# Patient Record
Sex: FEMALE | Race: WHITE | URBAN - METROPOLITAN AREA
[De-identification: names, ages, dates, MRNs, and addresses within clinical notes are randomized per-mention and may not be internally consistent; named-entity substitution may affect disease eponyms.]

---

## 2017-09-24 ENCOUNTER — HOSPITAL ENCOUNTER (INPATIENT)
Age: 22
LOS: 3 days | Discharge: HOME | End: 2017-09-27
Attending: OBSTETRICS & GYNECOLOGY | Admitting: OBSTETRICS & GYNECOLOGY
Payer: COMMERCIAL

## 2017-09-24 DIAGNOSIS — O48.0 POST-TERM PREGNANCY, 40-42 WEEKS OF GESTATION: ICD-10-CM

## 2017-09-24 DIAGNOSIS — O09.93 SUPERVISION OF HIGH RISK PREGNANCY IN THIRD TRIMESTER: ICD-10-CM

## 2017-09-24 DIAGNOSIS — Z37.9 NORMAL LABOR: ICD-10-CM

## 2017-09-24 DIAGNOSIS — B95.1 POSITIVE GBS TEST: ICD-10-CM

## 2017-09-24 DIAGNOSIS — O35.EXX0 FETAL RENAL ANOMALY, SINGLE GESTATION: ICD-10-CM

## 2017-09-24 DIAGNOSIS — Z22.322 MRSA (METHICILLIN RESISTANT STAPHYLOCOCCUS AUREUS) CARRIER: Chronic | ICD-10-CM

## 2017-09-24 PROBLEM — Z34.90 PREGNANCY: Status: ACTIVE | Noted: 2017-09-24

## 2017-09-24 LAB
ABO + RH BLD: NORMAL
ALBUMIN SERPL-MCNC: 2.6 G/DL (ref 3.5–5)
ALBUMIN/GLOB SERPL: 0.6 {RATIO} (ref 1.2–3.5)
ALP SERPL-CCNC: 179 U/L (ref 50–136)
ALT SERPL-CCNC: 15 U/L (ref 12–65)
AMPHET UR QL SCN: NEGATIVE
ANION GAP SERPL CALC-SCNC: 8 MMOL/L (ref 7–16)
AST SERPL-CCNC: 16 U/L (ref 15–37)
BACTERIA SPEC CULT: ABNORMAL
BACTERIA SPEC CULT: ABNORMAL
BACTERIA SPEC CULT: NORMAL
BARBITURATES UR QL SCN: NEGATIVE
BENZODIAZ UR QL: NEGATIVE
BILIRUB SERPL-MCNC: 0.3 MG/DL (ref 0.2–1.1)
BLOOD GROUP ANTIBODIES SERPL: NORMAL
BUN SERPL-MCNC: 7 MG/DL (ref 6–23)
CALCIUM SERPL-MCNC: 8.4 MG/DL (ref 8.3–10.4)
CANNABINOIDS UR QL SCN: NEGATIVE
CHLORIDE SERPL-SCNC: 104 MMOL/L (ref 98–107)
CO2 SERPL-SCNC: 27 MMOL/L (ref 21–32)
COCAINE UR QL SCN: NEGATIVE
CREAT SERPL-MCNC: 0.79 MG/DL (ref 0.6–1)
ERYTHROCYTE [DISTWIDTH] IN BLOOD BY AUTOMATED COUNT: 16 % (ref 11.9–14.6)
GLOBULIN SER CALC-MCNC: 4.3 G/DL (ref 2.3–3.5)
GLUCOSE SERPL-MCNC: 96 MG/DL (ref 65–100)
HCT VFR BLD AUTO: 36.1 % (ref 35.8–46.3)
HGB BLD-MCNC: 11.5 G/DL (ref 11.7–15.4)
HIV1 P24 AG SERPL QL IA: NONREACTIVE
HIV1+2 AB SERPL QL IA: NONREACTIVE
MCH RBC QN AUTO: 27.6 PG (ref 26.1–32.9)
MCHC RBC AUTO-ENTMCNC: 31.9 G/DL (ref 31.4–35)
MCV RBC AUTO: 86.6 FL (ref 79.6–97.8)
METHADONE UR QL: NEGATIVE
OPIATES UR QL: NEGATIVE
PCP UR QL: NEGATIVE
PLATELET # BLD AUTO: 178 K/UL (ref 150–450)
PMV BLD AUTO: 13.8 FL (ref 10.8–14.1)
POTASSIUM SERPL-SCNC: 3.9 MMOL/L (ref 3.5–5.1)
PROT SERPL-MCNC: 6.9 G/DL (ref 6.3–8.2)
RBC # BLD AUTO: 4.17 M/UL (ref 4.05–5.25)
RPR SER QL: NONREACTIVE
RUBV IGG SERPL IA-ACNC: >500 IU/ML
SERVICE CMNT-IMP: ABNORMAL
SERVICE CMNT-IMP: NORMAL
SODIUM SERPL-SCNC: 139 MMOL/L (ref 136–145)
SPECIMEN EXP DATE BLD: NORMAL
WBC # BLD AUTO: 8.9 K/UL (ref 4.3–11.1)

## 2017-09-24 PROCEDURE — 86592 SYPHILIS TEST NON-TREP QUAL: CPT | Performed by: OBSTETRICS & GYNECOLOGY

## 2017-09-24 PROCEDURE — 87081 CULTURE SCREEN ONLY: CPT | Performed by: OBSTETRICS & GYNECOLOGY

## 2017-09-24 PROCEDURE — 86901 BLOOD TYPING SEROLOGIC RH(D): CPT | Performed by: OBSTETRICS & GYNECOLOGY

## 2017-09-24 PROCEDURE — 87521 HEPATITIS C PROBE&RVRS TRNSC: CPT | Performed by: OBSTETRICS & GYNECOLOGY

## 2017-09-24 PROCEDURE — 76819 FETAL BIOPHYS PROFIL W/O NST: CPT | Performed by: OBSTETRICS & GYNECOLOGY

## 2017-09-24 PROCEDURE — 87653 STREP B DNA AMP PROBE: CPT | Performed by: OBSTETRICS & GYNECOLOGY

## 2017-09-24 PROCEDURE — 87641 MR-STAPH DNA AMP PROBE: CPT | Performed by: OBSTETRICS & GYNECOLOGY

## 2017-09-24 PROCEDURE — 86762 RUBELLA ANTIBODY: CPT | Performed by: OBSTETRICS & GYNECOLOGY

## 2017-09-24 PROCEDURE — 76805 OB US >/= 14 WKS SNGL FETUS: CPT | Performed by: OBSTETRICS & GYNECOLOGY

## 2017-09-24 PROCEDURE — 87389 HIV-1 AG W/HIV-1&-2 AB AG IA: CPT | Performed by: OBSTETRICS & GYNECOLOGY

## 2017-09-24 PROCEDURE — 74011250636 HC RX REV CODE- 250/636: Performed by: OBSTETRICS & GYNECOLOGY

## 2017-09-24 PROCEDURE — 80053 COMPREHEN METABOLIC PANEL: CPT | Performed by: OBSTETRICS & GYNECOLOGY

## 2017-09-24 PROCEDURE — 87340 HEPATITIS B SURFACE AG IA: CPT | Performed by: OBSTETRICS & GYNECOLOGY

## 2017-09-24 PROCEDURE — 76815 OB US LIMITED FETUS(S): CPT

## 2017-09-24 PROCEDURE — 59025 FETAL NON-STRESS TEST: CPT

## 2017-09-24 PROCEDURE — 99284 EMERGENCY DEPT VISIT MOD MDM: CPT

## 2017-09-24 PROCEDURE — 36415 COLL VENOUS BLD VENIPUNCTURE: CPT | Performed by: OBSTETRICS & GYNECOLOGY

## 2017-09-24 PROCEDURE — 99255 IP/OBS CONSLTJ NEW/EST HI 80: CPT | Performed by: OBSTETRICS & GYNECOLOGY

## 2017-09-24 PROCEDURE — 80307 DRUG TEST PRSMV CHEM ANLYZR: CPT | Performed by: OBSTETRICS & GYNECOLOGY

## 2017-09-24 PROCEDURE — 65270000029 HC RM PRIVATE

## 2017-09-24 PROCEDURE — 85027 COMPLETE CBC AUTOMATED: CPT | Performed by: OBSTETRICS & GYNECOLOGY

## 2017-09-24 RX ORDER — BUTORPHANOL TARTRATE 1 MG/ML
1 INJECTION INTRAMUSCULAR; INTRAVENOUS
Status: DISCONTINUED | OUTPATIENT
Start: 2017-09-24 | End: 2017-09-25

## 2017-09-24 RX ORDER — FOLIC ACID 1 MG/1
TABLET ORAL DAILY
COMMUNITY

## 2017-09-24 RX ORDER — SODIUM CHLORIDE 0.9 % (FLUSH) 0.9 %
5-10 SYRINGE (ML) INJECTION EVERY 8 HOURS
Status: DISCONTINUED | OUTPATIENT
Start: 2017-09-24 | End: 2017-09-24

## 2017-09-24 RX ORDER — DEXTROSE, SODIUM CHLORIDE, SODIUM LACTATE, POTASSIUM CHLORIDE, AND CALCIUM CHLORIDE 5; .6; .31; .03; .02 G/100ML; G/100ML; G/100ML; G/100ML; G/100ML
125 INJECTION, SOLUTION INTRAVENOUS CONTINUOUS
Status: DISCONTINUED | OUTPATIENT
Start: 2017-09-24 | End: 2017-09-24

## 2017-09-24 RX ORDER — LIDOCAINE HYDROCHLORIDE 20 MG/ML
JELLY TOPICAL
Status: DISCONTINUED | OUTPATIENT
Start: 2017-09-24 | End: 2017-09-24

## 2017-09-24 RX ORDER — SODIUM CHLORIDE 0.9 % (FLUSH) 0.9 %
5-10 SYRINGE (ML) INJECTION AS NEEDED
Status: DISCONTINUED | OUTPATIENT
Start: 2017-09-24 | End: 2017-09-25 | Stop reason: HOSPADM

## 2017-09-24 RX ORDER — SODIUM CHLORIDE 0.9 % (FLUSH) 0.9 %
5-10 SYRINGE (ML) INJECTION AS NEEDED
Status: DISCONTINUED | OUTPATIENT
Start: 2017-09-24 | End: 2017-09-24

## 2017-09-24 RX ORDER — OXYTOCIN/RINGER'S LACTATE 15/250 ML
250 PLASTIC BAG, INJECTION (ML) INTRAVENOUS ONCE
Status: DISCONTINUED | OUTPATIENT
Start: 2017-09-24 | End: 2017-09-24

## 2017-09-24 RX ORDER — MINERAL OIL
120 OIL (ML) ORAL
Status: DISCONTINUED | OUTPATIENT
Start: 2017-09-24 | End: 2017-09-24

## 2017-09-24 RX ORDER — ZOLPIDEM TARTRATE 5 MG/1
5 TABLET ORAL
Status: DISCONTINUED | OUTPATIENT
Start: 2017-09-24 | End: 2017-09-25

## 2017-09-24 RX ORDER — LIDOCAINE HYDROCHLORIDE 10 MG/ML
1 INJECTION INFILTRATION; PERINEURAL
Status: DISCONTINUED | OUTPATIENT
Start: 2017-09-24 | End: 2017-09-24

## 2017-09-24 RX ADMIN — BUTORPHANOL TARTRATE 1 MG: 1 INJECTION, SOLUTION INTRAMUSCULAR; INTRAVENOUS at 20:55

## 2017-09-24 NOTE — PROGRESS NOTES
18 g IV started in right arm X 1 attempt. Per Dr. Chevy Huitron no IV fluids at this time and pt may have reg diet.

## 2017-09-24 NOTE — PROGRESS NOTES
Pt to [facility] ED for c/o contractions. Unassigned. . Moved here from [place] two months ago. Has not been able to obtain care since arrival to [place].  Dr. Ghotra Fret aware of pt arrival.

## 2017-09-24 NOTE — PROCEDURES
Portable Ultrasound Procedure Report    Reason for Ultrasound- Poor prenatal care; concern for growth lag  Requesting PALMIRA Rae    Ultrasound performed at bedside for evaluation of pregnancy. Ultrasound Type: Transabdominal    Findings: Normal fetus, uterus, and placenta. Reassuring fetal status. See detailed report to follow on chart. Estimated Fetal Weight:  3042 grams   Estimated Gestational Age by Ultrasound: 37 weeks 0 days, adequate symmetric growth  Fetal Position: Vertex  Amniotic Fluid: Normal, Amniotic Fluid Index was 7.5 centimeters.   BPP-8/8    Placenta: Posterior   Fetus: Limited anatomy survey; prominent renal pelves  \      Waqas Carlton MD
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
## 2017-09-24 NOTE — PROGRESS NOTES
9/24/2017  5:58 PM - E[facility], [facility] [employer]       Component Results      Component Value Flag Ref Range Units Status     Special Requests: NO SPECIAL REQUESTS     Final     Culture result:      Final     MRSA target DNA is not detected (presumptive not colonized with MRSA)

## 2017-09-24 NOTE — PROGRESS NOTES
Discussed with pt plan for delivery  Recommended attempt at vaginal delivery with cervidil tonight and pitocin tomorrow  Pt very strongly desires primary elective . This has been her plan for several months  Discussed risks/benefits of both routes  Will plan to proceed with  in am  sve unchanged  Will have anesthesia see pt tonight.

## 2017-09-24 NOTE — IP AVS SNAPSHOT
[facility] 
 
 
 [address] [place], [place] [id number] [facility] 
[phone] Patient: Max Toledo MRN: [id number] WFA:6/4/2014 Current Discharge Medication List  
  
CONTINUE these medications which have NOT CHANGED Dose & Instructions Dispensing Information Comments Morning Noon Evening Bedtime  
 folic acid 1 mg tablet Commonly known as:  Google Your last dose was: Your next dose is: Take  by mouth daily. Refills:  0

## 2017-09-24 NOTE — IP AVS SNAPSHOT
[address] [place] [address] Rd 
[phone] Patient: Max Toledo MRN: [id number] CAIT:9[birth year]4181 You are allergic to the following Allergen Reactions Adhesive Tape-Silicones Rash Vancomycin Itching \"jo syndrome\" Immunizations Administered for This Admission Name Date Influenza Vaccine (Quad) PF  Deferred () Tdap  Deferred () Recent Documentation Height Weight Breastfeeding? BMI OB Status Smoking Status 1.626 m 54.9 kg Unknown 20.77 kg/m2 Recent pregnancy Never Smoker Unresulted Labs Order Current Status HCV RNA ROSHNI QUALITATIVE In process CULTURE, WOUND W GRAM STAIN Preliminary result About your hospitalization You were admitted on:  September 24, 2017 You last received care in the:  [address] [facility] You were discharged on:  September 27, 2017 Unit phone number:  [phone] Why you were hospitalized Your primary diagnosis was:  Supervision Of High Risk Pregnancy In Third Trimester Your diagnoses also included:  Post-Dates Pregnancy, Normal Labor, Positive Gbs Test, Fetal Renal Anomaly, Single Gestation, Mrsa (Methicillin Resistant Staphylococcus Aureus) Carrier Providers Seen During Your Hospitalizations Provider Role Specialty Primary office phone Kinsey Aguirre MD Attending Provider Obstetrics & Gynecology [phone] Your Primary Care Physician (PCP) Primary Care Physician Office Phone Office Fax UNKNOWN, PROVIDER ** None ** ** None ** Follow-up Information Follow up With Details Comments Contact Info Provider Unknown   Patient not available to ask Stefany Davalos MD Schedule an appointment as soon as possible for a visit in 2 weeks  [address] 
[phone] Current Discharge Medication List  
  
 CONTINUE these medications which have NOT CHANGED Dose & Instructions Dispensing Information Comments Morning Noon Evening Bedtime  
 folic acid 1 mg tablet Commonly known as:  Google Your last dose was: Your next dose is: Take  by mouth daily. Refills:  0 Discharge Instructions After Your Delivery (the Postpartum Period): Care Instructions Your Care Instructions Congratulations on the birth of your baby. Like pregnancy, the  period can be a time of excitement, chichi, and exhaustion. You may look at your wondrous little baby and feel happy. You may also be overwhelmed by your new sleep hours and new responsibilities. At first, babies often sleep during the days and are awake at night. They do not have a pattern or routine. They may make sudden gasps, jerk themselves awake, or look like they have crossed eyes. These are all normal, and they may even make you smile. In these first weeks after delivery, try to take good care of yourself. It may take 4 to 6 weeks to feel like yourself again, and possibly longer if you had a  birth. You will likely feel very tired for several weeks. Your days will be full of ups and downs, but lots of chichi as well. Follow-up care is a key part of your treatment and safety. Be sure to make and go to all appointments, and call your doctor if you are having problems. It's also a good idea to know your test results and keep a list of the medicines you take. How can you care for yourself at home? Take care of your body after delivery · Use pads instead of tampons for the bloody flow that may last as long as 2 weeks. · Ease cramps with ibuprofen (Advil, Motrin). · Ease soreness of hemorrhoids and the area between your vagina and rectum with ice compresses or witch hazel pads. · Ease constipation by drinking lots of fluid and eating high-fiber foods. Ask your doctor about over-the-counter stool softeners. · Cleanse yourself with a gentle squeeze of warm water from a bottle instead of wiping with toilet paper. · Take a sitz bath in warm water several times a day. · Wear a good nursing bra. Ease sore and swollen breasts with warm, wet washcloths. · If you are not breastfeeding, use ice rather than heat for breast soreness. · Your period may not start for several months if you are breastfeeding. You may bleed more, and longer at first, than you did before you got pregnant. · Wait until you are healed (about 4 to 6 weeks) before you have sexual intercourse. Your doctor will tell you when it is okay to have sex. · Try not to travel with your baby for 5 or 6 weeks. If you take a long car trip, make frequent stops to walk around and stretch. Avoid exhaustion · Rest every day. Try to nap when your baby naps. · Ask another adult to be with you for a few days after delivery. · Plan for  if you have other children. · Stay flexible so you can eat at odd hours and sleep when you need to. Both you and your baby are making new schedules. · Plan small trips to get out of the house. Change can make you feel less tired. · Ask for help with housework, cooking, and shopping. Remind yourself that your job is to care for your baby. Know about help for postpartum depression · \"Baby blues\" are common for the first 1 to 2 weeks after birth. You may cry or feel sad or irritable for no reason. · Rest whenever you can. Being tired makes it harder to handle your emotions. · Go for walks with your baby. · Talk to your partner, friends, and family about your feelings. · If your symptoms last for more than a few weeks, or if you feel very depressed, ask your doctor for help. · Postpartum depression can be treated. Support groups and counseling can help. Sometimes medicine can also help. Stay healthy · Eat healthy foods so you have more energy, make good breast milk, and lose extra baby pounds. · If you breastfeed, avoid alcohol and drugs. Stay smoke-free. If you quit during pregnancy, congratulations. · Start daily exercise after 4 to 6 weeks, but rest when you feel tired. · Learn exercises to tone your belly. Do Kegel exercises to regain strength in your pelvic muscles. You can do these exercises while you stand or sit. ¨ Squeeze the same muscles you would use to stop your urine. Your belly and thighs should not move. ¨ Hold the squeeze for 3 seconds, and then relax for 3 seconds. ¨ Start with 3 seconds. Then add 1 second each week until you are able to squeeze for 10 seconds. ¨ Repeat the exercise 10 to 15 times for each session. Do three or more sessions each day. · Find a class for new mothers and new babies that has an exercise time. · If you had a  birth, give yourself a bit more time before you exercise, and be careful. When should you call for help? Call 911 anytime you think you may need emergency care. For example, call if: 
· You passed out (lost consciousness). Call your doctor now or seek immediate medical care if: 
· You have severe vaginal bleeding. This means you are passing blood clots and soaking through a pad each hour for 2 or more hours. · You are dizzy or lightheaded, or you feel like you may faint. · You have a fever. · You have new belly pain, or your pain gets worse. Watch closely for changes in your health, and be sure to contact your doctor if: 
· Your vaginal bleeding seems to be getting heavier. · You have new or worse vaginal discharge. · You feel sad, anxious, or hopeless for more than a few days. · You do not get better as expected. Where can you learn more? Go to http://carlene-carol ann.info/. Enter A480 in the search box to learn more about \"After Your Delivery (the Postpartum Period): Care Instructions. \" 
 Current as of: March 16, 2017 Content Version: 11.3 © 6373-9556 [employer]. Care instructions adapted under license by [employer] (which disclaims liability or warranty for this information). If you have questions about a medical condition or this instruction, always ask your healthcare professional. [address] any warranty or liability for your use of this information. Discharge Orders None [facility] Announcement We are excited to announce that we are making your provider's discharge notes available to you in [employer]im. You will see these notes when they are completed and signed by the physician that discharged you from your recent hospital stay. If you have any questions or concerns about any information you see in Truminim, please call the Health Information Department where you were seen or reach out to your Primary Care Provider for more information about your plan of care. Introducing [facility]! [facility] introduces Truminim patient portal. Now you can access parts of your medical record, email your doctor's office, and request medication refills online. 1. In your internet browser, go to [email or web address] 2. Click on the First Time User? Click Here link in the Sign In box. You will see the New Member Sign Up page. 3. Enter your Truminim Access Code exactly as it appears below. You will not need to use this code after youve completed the sign-up process. If you do not sign up before the expiration date, you must request a new code. · Truminim Access Code: [email or web address] Expires: 12/26/2017 12:33 PM 
 
4. Enter the last four digits of your Social Security Number (xxxx) and Date of Birth (mm/dd/yyyy) as indicated and click Submit. You will be taken to the next sign-up page. 5. Create a Truminim ID.  This will be your Truminim login ID and cannot be changed, so think of one that is secure and easy to remember. 6. Create a [employer] password. You can change your password at any time. 7. Enter your Password Reset Question and Answer. This can be used at a later time if you forget your password. 8. Enter your e-mail address. You will receive e-mail notification when new information is available in [address] 9. Click Sign Up. You can now view and download portions of your medical record. 10. Click the Download Summary menu link to download a portable copy of your medical information. If you have questions, please visit the Frequently Asked Questions section of the [employer] website. Remember, [employer] is NOT to be used for urgent needs. For medical emergencies, dial 911. Now available from your iPhone and Android! General Information Please provide this summary of care documentation to your next provider. Patient Signature:  ____________________________________________________________ Date:  ____________________________________________________________  
  
Melody Crawford Provider Signature:  ____________________________________________________________ Date:  ____________________________________________________________
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
## 2017-09-25 ENCOUNTER — ANESTHESIA EVENT (OUTPATIENT)
Dept: LABOR AND DELIVERY | Age: 22
End: 2017-09-25
Payer: COMMERCIAL

## 2017-09-25 ENCOUNTER — ANESTHESIA (OUTPATIENT)
Dept: LABOR AND DELIVERY | Age: 22
End: 2017-09-25
Payer: COMMERCIAL

## 2017-09-25 LAB
BASE DEFICIT BLDCOA-SCNC: 6.8 MMOL/L (ref 0–2)
BASE DEFICIT BLDCOV-SCNC: 6.8 MMOL/L (ref 1.9–7.7)
BDY SITE: ABNORMAL
BDY SITE: ABNORMAL
HCO3 BLDCOA-SCNC: 21 MMOL/L (ref 22–26)
HCO3 BLDV-SCNC: 21 MMOL/L
PCO2 BLDCOA: 49 MMHG (ref 33–49)
PCO2 BLDCOV: 49 MMHG (ref 14.1–43.3)
PH BLDCOA: 7.24 [PH] (ref 7.21–7.31)
PH BLDCOV: 7.25 [PH] (ref 7.2–7.44)
PO2 BLDCOA: 22 MMHG (ref 9–19)
PO2 BLDV: 22 MMHG (ref 30.4–57.2)
SERVICE CMNT-IMP: ABNORMAL
SERVICE CMNT-IMP: ABNORMAL

## 2017-09-25 PROCEDURE — 74011258636 HC RX REV CODE- 258/636: Performed by: OBSTETRICS & GYNECOLOGY

## 2017-09-25 PROCEDURE — A4300 CATH IMPL VASC ACCESS PORTAL: HCPCS | Performed by: ANESTHESIOLOGY

## 2017-09-25 PROCEDURE — 74011250637 HC RX REV CODE- 250/637: Performed by: OBSTETRICS & GYNECOLOGY

## 2017-09-25 PROCEDURE — 74011000258 HC RX REV CODE- 258: Performed by: OBSTETRICS & GYNECOLOGY

## 2017-09-25 PROCEDURE — 0KQM0ZZ REPAIR PERINEUM MUSCLE, OPEN APPROACH: ICD-10-PCS | Performed by: OBSTETRICS & GYNECOLOGY

## 2017-09-25 PROCEDURE — 74011250636 HC RX REV CODE- 250/636: Performed by: OBSTETRICS & GYNECOLOGY

## 2017-09-25 PROCEDURE — 75410000002 HC LABOR FEE PER 1 HR

## 2017-09-25 PROCEDURE — 75410000003 HC RECOV DEL/VAG/CSECN EA 0.5 HR

## 2017-09-25 PROCEDURE — 74011250636 HC RX REV CODE- 250/636

## 2017-09-25 PROCEDURE — 77030018846 HC SOL IRR STRL H20 ICUM -A

## 2017-09-25 PROCEDURE — 77030005518 HC CATH URETH FOL 2W BARD -B

## 2017-09-25 PROCEDURE — 4A1HXCZ MONITORING OF PRODUCTS OF CONCEPTION, CARDIAC RATE, EXTERNAL APPROACH: ICD-10-PCS | Performed by: OBSTETRICS & GYNECOLOGY

## 2017-09-25 PROCEDURE — 77030003028 HC SUT VCRL J&J -A

## 2017-09-25 PROCEDURE — 77030011943

## 2017-09-25 PROCEDURE — 77030032490 HC SLV COMPR SCD KNE COVD -B

## 2017-09-25 PROCEDURE — 75410000000 HC DELIVERY VAGINAL/SINGLE

## 2017-09-25 PROCEDURE — 77030014125 HC TY EPDRL BBMI -B: Performed by: ANESTHESIOLOGY

## 2017-09-25 PROCEDURE — 82803 BLOOD GASES ANY COMBINATION: CPT

## 2017-09-25 PROCEDURE — 10907ZC DRAINAGE OF AMNIOTIC FLUID, THERAPEUTIC FROM PRODUCTS OF CONCEPTION, VIA NATURAL OR ARTIFICIAL OPENING: ICD-10-PCS | Performed by: OBSTETRICS & GYNECOLOGY

## 2017-09-25 PROCEDURE — 65270000029 HC RM PRIVATE

## 2017-09-25 PROCEDURE — 76060000078 HC EPIDURAL ANESTHESIA

## 2017-09-25 RX ORDER — DEXTROSE, SODIUM CHLORIDE, SODIUM LACTATE, POTASSIUM CHLORIDE, AND CALCIUM CHLORIDE 5; .6; .31; .03; .02 G/100ML; G/100ML; G/100ML; G/100ML; G/100ML
125 INJECTION, SOLUTION INTRAVENOUS CONTINUOUS
Status: DISCONTINUED | OUTPATIENT
Start: 2017-09-25 | End: 2017-09-25

## 2017-09-25 RX ORDER — ROPIVACAINE HYDROCHLORIDE 2 MG/ML
INJECTION, SOLUTION EPIDURAL; INFILTRATION; PERINEURAL
Status: DISCONTINUED | OUTPATIENT
Start: 2017-09-25 | End: 2017-09-25 | Stop reason: HOSPADM

## 2017-09-25 RX ORDER — OXYTOCIN/RINGER'S LACTATE 30/500 ML
.5-2 PLASTIC BAG, INJECTION (ML) INTRAVENOUS
Status: DISCONTINUED | OUTPATIENT
Start: 2017-09-25 | End: 2017-09-25

## 2017-09-25 RX ORDER — DOCUSATE SODIUM 100 MG/1
100 CAPSULE, LIQUID FILLED ORAL 2 TIMES DAILY
Status: DISCONTINUED | OUTPATIENT
Start: 2017-09-25 | End: 2017-09-27 | Stop reason: HOSPADM

## 2017-09-25 RX ORDER — BUTORPHANOL TARTRATE 1 MG/ML
1 INJECTION INTRAMUSCULAR; INTRAVENOUS ONCE
Status: COMPLETED | OUTPATIENT
Start: 2017-09-25 | End: 2017-09-25

## 2017-09-25 RX ORDER — HYDROCODONE BITARTRATE AND ACETAMINOPHEN 5; 325 MG/1; MG/1
1 TABLET ORAL
Status: DISCONTINUED | OUTPATIENT
Start: 2017-09-25 | End: 2017-09-27 | Stop reason: HOSPADM

## 2017-09-25 RX ORDER — SIMETHICONE 80 MG
80 TABLET,CHEWABLE ORAL
Status: DISCONTINUED | OUTPATIENT
Start: 2017-09-25 | End: 2017-09-27 | Stop reason: HOSPADM

## 2017-09-25 RX ORDER — OXYTOCIN/RINGER'S LACTATE 15/250 ML
250 PLASTIC BAG, INJECTION (ML) INTRAVENOUS ONCE
Status: ACTIVE | OUTPATIENT
Start: 2017-09-25 | End: 2017-09-26

## 2017-09-25 RX ORDER — MINERAL OIL
120 OIL (ML) ORAL AS NEEDED
Status: DISCONTINUED | OUTPATIENT
Start: 2017-09-25 | End: 2017-09-25

## 2017-09-25 RX ORDER — DIPHENHYDRAMINE HCL 25 MG
25 CAPSULE ORAL
Status: DISCONTINUED | OUTPATIENT
Start: 2017-09-25 | End: 2017-09-27 | Stop reason: HOSPADM

## 2017-09-25 RX ORDER — IBUPROFEN 800 MG/1
800 TABLET ORAL
Status: DISCONTINUED | OUTPATIENT
Start: 2017-09-25 | End: 2017-09-27 | Stop reason: HOSPADM

## 2017-09-25 RX ORDER — ZOLPIDEM TARTRATE 5 MG/1
5 TABLET ORAL
Status: DISCONTINUED | OUTPATIENT
Start: 2017-09-25 | End: 2017-09-27 | Stop reason: HOSPADM

## 2017-09-25 RX ORDER — PRENATAL VIT 96/IRON FUM/FOLIC 27MG-0.8MG
1 TABLET ORAL DAILY
Status: DISCONTINUED | OUTPATIENT
Start: 2017-09-26 | End: 2017-09-27 | Stop reason: HOSPADM

## 2017-09-25 RX ORDER — FENTANYL CITRATE 50 UG/ML
INJECTION, SOLUTION INTRAMUSCULAR; INTRAVENOUS
Status: ACTIVE
Start: 2017-09-25 | End: 2017-09-25

## 2017-09-25 RX ORDER — NALOXONE HYDROCHLORIDE 0.4 MG/ML
0.4 INJECTION, SOLUTION INTRAMUSCULAR; INTRAVENOUS; SUBCUTANEOUS AS NEEDED
Status: DISCONTINUED | OUTPATIENT
Start: 2017-09-25 | End: 2017-09-27 | Stop reason: HOSPADM

## 2017-09-25 RX ORDER — ONDANSETRON 2 MG/ML
4 INJECTION INTRAMUSCULAR; INTRAVENOUS
Status: DISCONTINUED | OUTPATIENT
Start: 2017-09-25 | End: 2017-09-27 | Stop reason: HOSPADM

## 2017-09-25 RX ORDER — ROPIVACAINE HYDROCHLORIDE 2 MG/ML
INJECTION, SOLUTION EPIDURAL; INFILTRATION; PERINEURAL AS NEEDED
Status: DISCONTINUED | OUTPATIENT
Start: 2017-09-25 | End: 2017-09-25 | Stop reason: HOSPADM

## 2017-09-25 RX ORDER — ONDANSETRON 8 MG/1
8 TABLET, ORALLY DISINTEGRATING ORAL
Status: DISCONTINUED | OUTPATIENT
Start: 2017-09-25 | End: 2017-09-25

## 2017-09-25 RX ORDER — PROMETHAZINE HYDROCHLORIDE 25 MG/ML
12.5 INJECTION, SOLUTION INTRAMUSCULAR; INTRAVENOUS ONCE
Status: COMPLETED | OUTPATIENT
Start: 2017-09-25 | End: 2017-09-25

## 2017-09-25 RX ORDER — FENTANYL CITRATE 50 UG/ML
INJECTION, SOLUTION INTRAMUSCULAR; INTRAVENOUS AS NEEDED
Status: DISCONTINUED | OUTPATIENT
Start: 2017-09-25 | End: 2017-09-25 | Stop reason: HOSPADM

## 2017-09-25 RX ADMIN — SODIUM CHLORIDE, SODIUM LACTATE, POTASSIUM CHLORIDE, CALCIUM CHLORIDE, AND DEXTROSE MONOHYDRATE 125 ML/HR: 600; 310; 30; 20; 5 INJECTION, SOLUTION INTRAVENOUS at 04:30

## 2017-09-25 RX ADMIN — FENTANYL CITRATE 100 MCG: 50 INJECTION, SOLUTION INTRAMUSCULAR; INTRAVENOUS at 16:53

## 2017-09-25 RX ADMIN — ROPIVACAINE HYDROCHLORIDE: 2 INJECTION, SOLUTION EPIDURAL; INFILTRATION; PERINEURAL at 11:15

## 2017-09-25 RX ADMIN — SODIUM CHLORIDE, SODIUM LACTATE, POTASSIUM CHLORIDE, CALCIUM CHLORIDE, AND DEXTROSE MONOHYDRATE 125 ML/HR: 600; 310; 30; 20; 5 INJECTION, SOLUTION INTRAVENOUS at 10:34

## 2017-09-25 RX ADMIN — PENICILLIN G POTASSIUM 2.5 MILLION UNITS: 20000000 POWDER, FOR SOLUTION INTRAVENOUS at 10:14

## 2017-09-25 RX ADMIN — PROMETHAZINE HYDROCHLORIDE 12.5 MG: 25 INJECTION INTRAMUSCULAR; INTRAVENOUS at 00:21

## 2017-09-25 RX ADMIN — SODIUM CHLORIDE 5 MILLION UNITS: 900 INJECTION, SOLUTION INTRAVENOUS at 06:20

## 2017-09-25 RX ADMIN — FENTANYL CITRATE 100 MCG: 50 INJECTION, SOLUTION INTRAMUSCULAR; INTRAVENOUS at 03:31

## 2017-09-25 RX ADMIN — BUTORPHANOL TARTRATE 1 MG: 1 INJECTION, SOLUTION INTRAMUSCULAR; INTRAVENOUS at 00:21

## 2017-09-25 RX ADMIN — PENICILLIN G POTASSIUM 2.5 MILLION UNITS: 20000000 POWDER, FOR SOLUTION INTRAVENOUS at 13:47

## 2017-09-25 RX ADMIN — ROPIVACAINE HYDROCHLORIDE 8 ML: 2 INJECTION, SOLUTION EPIDURAL; INFILTRATION; PERINEURAL at 03:31

## 2017-09-25 RX ADMIN — OXYTOCIN 2 MILLI-UNITS/MIN: 10 INJECTION, SOLUTION INTRAMUSCULAR; INTRAVENOUS at 10:15

## 2017-09-25 RX ADMIN — SODIUM CHLORIDE, SODIUM LACTATE, POTASSIUM CHLORIDE, AND CALCIUM CHLORIDE 500 ML: 600; 310; 30; 20 INJECTION, SOLUTION INTRAVENOUS at 04:30

## 2017-09-25 RX ADMIN — IBUPROFEN 800 MG: 800 TABLET ORAL at 20:21

## 2017-09-25 RX ADMIN — ONDANSETRON 8 MG: 8 TABLET, ORALLY DISINTEGRATING ORAL at 13:52

## 2017-09-25 RX ADMIN — ROPIVACAINE HYDROCHLORIDE 3 ML: 2 INJECTION, SOLUTION EPIDURAL; INFILTRATION; PERINEURAL at 16:53

## 2017-09-25 RX ADMIN — HYDROCODONE BITARTRATE AND ACETAMINOPHEN 1 TABLET: 5; 325 TABLET ORAL at 20:21

## 2017-09-25 RX ADMIN — ROPIVACAINE HYDROCHLORIDE 10 ML/HR: 2 INJECTION, SOLUTION EPIDURAL; INFILTRATION; PERINEURAL at 03:33

## 2017-09-25 NOTE — PROGRESS NOTES
Azar Magaña at bedside at 4004. Assisted pt to sitting up on bedside at 5398. Timeout completed at 7442  with MD and myself at bedside. Test dose given at 0326. Negative reaction. Pt assisted to lying back in left tilt position at 0333. See anesthesia record for details. See vital sign flow sheet for BP. Tolerated procedure well.

## 2017-09-25 NOTE — PROGRESS NOTES
CULTURE, Sathish Ventura [[id number]] (Order [id number])   Microbiology    Date and Time: 9/25/2017  6:41 AM   Department: [facility] 4 LABOR & DELIVERY   Ordering/Authorizing: Domonique Reddy MD         Order Providers      Authorizing     Domonique Reddy       Order Information      Order Date/Time Release Date/Time Start Date/Time End Date/Time     09/25/17 06:41 AM None 09/25/17 06:45 AM 09/25/17 06:45 AM           Contact precautions ordered until per Airam Branham RN per Mauricio Estes in Infection Control.

## 2017-09-25 NOTE — PROGRESS NOTES
Pt admitted to Room 436. Admission assessment completed. Discussed plan of care with patient. Discussed pt's choice of infant feeding method. Feeding options explored, including the importance of exclusive breastfeeding. Pt informed of our breastfeeding support system from from nursing staff and lactation staff during inpatient stay and following discharge. Questions and concerns addressed at this time. Pt confirms bottlefeeding breastmilk and formula is her decision at this time. Ramon Fish
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
## 2017-09-25 NOTE — PROGRESS NOTES
0010: SVE= 2/90/-2. Patient complaining of pain  0020: updated Dr. Misty Bragg at desk of VE and UC and patient crying in pain. Orders received to administer IM phenergan and IM Stadol- see MAR and patient may have epidural if pain does not go away and ambien order discontinued.

## 2017-09-25 NOTE — PROGRESS NOTES
Dr. Izabella Wilhelm at bedside to check patient and to discuss Plan of Care. Patient is now 7cm/100/0 with bulging bag. Patient agress that she would like to try for a vaginal delivery.  PenG 5 million units adminsistered per md order for patient being GBS positive

## 2017-09-25 NOTE — ANESTHESIA PROCEDURE NOTES
Epidural Block    Start time: 9/25/2017 3:22 AM  End time: 9/25/2017 3:28 AM  Performed by: Bonnie Gonzalez  Authorized by: Bonnie Gonzalez     Pre-Procedure  Indication: at surgeon's request, procedure for pain and labor epidural    Preanesthetic Checklist: patient identified, risks and benefits discussed, anesthesia consent, site marked, patient being monitored, timeout performed and anesthesia consent    Timeout Time: 03:18        Epidural:   Patient position:  Seated  Prep region:  Lumbar  Prep: Chlorhexidine    Location:  L2-3    Needle and Epidural Catheter:   Needle Type:  Tuohy  Needle Gauge:  19 G  Injection Technique:  Loss of resistance using air and loss of resistance using saline  Attempts:  1  Catheter Size:  20 G  Catheter at Skin Depth (cm):  9  Depth in Epidural Space (cm):  5  Events: no blood with aspiration, no cerebrospinal fluid with aspiration, no paresthesia and negative aspiration test    Test Dose:  Lidocaine 1.5% w/ epi and negative    Assessment:   Catheter Secured:  Tegaderm and tape  Insertion:  Uncomplicated  Patient tolerance:  Patient tolerated the procedure well with no immediate complications

## 2017-09-25 NOTE — PROGRESS NOTES
Wound assessed and mrsa culture of wound obtained as ordered. Right side of thigh, above the knee. 1 dime size wound assessed and redressed following culture. No drainage, no edema. Pink colored skin. Covered with 4x4 and paper tape. Reinforced with a tegaderm for protection.  Boyfriend at  and states \"this is what it normally looks like\"

## 2017-09-25 NOTE — PROGRESS NOTES
FHT's 150 cat 1. Pt comfortable w epidural.  Cx 7-8/100/-1 AROM w clear fluid. Ctx q 5 min or so, will augment w pitocin prn. Discussed positioning for delivery w pt, she has some limitation w ROM of hips and knees. Still has good sensation, will position according to her comfort level.
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
## 2017-09-25 NOTE — PROGRESS NOTES
Dr. Guadalupe Cruz at bedside to speak with patient about plan of care and to review strip. Bedside shift report to Amish Littlejohn RN and relinquished care of patient.

## 2017-09-25 NOTE — PROGRESS NOTES
In and out cath for 700 mls clear urine  Epidural pulled, tip intact  Iv hepwelled  pericare / pad change  Pt will be moved to 443

## 2017-09-25 NOTE — PROGRESS NOTES
Called Dr. Glimar Noble and informed her of prolonged deceleration and interventions provided and results and SVE and UC.

## 2017-09-25 NOTE — PROGRESS NOTES
1641-baby girl via vaginal delivery, terminal meconium present at delivery  1653Placenta   2nd degree laceration repair in progress  6719- Repair completed  1700-MD out of room  Pericare/pad change.  Recovery start @ 1705, s2s with baby

## 2017-09-25 NOTE — PROGRESS NOTES
Begin pushing. Pt comfortable with her legs in the stirrups. Pt letting us know how far her range of motion exists. States she is comfortable with her legs in the stirrups but not pulling them back. Handles presented and pt comfortable pulling with her arms and bearing down. Mother and  present.  Comfortable with her epidural.

## 2017-09-25 NOTE — PROGRESS NOTES
Luma Ace Rivas at bedside at 6308. Assisted pt to sitting up on bedside at 0318    Timeout completed at [phone] with MD and myself at bedside. Test dose given at ***. Negative reaction. Dose given at ***. Pt assisted to lying back in left tilt position. See anesthesia record for details. See vital sign flow sheet for BP. Tolerated procedure well.

## 2017-09-25 NOTE — PROGRESS NOTES
Pt remains on her right side. Pitocin remains at 6 mu/min. Pt agreeable to using the peanut. Remains on her right side. States \"this feels so much better\" after pt is positioned on the peanut. Mother remains at bs.

## 2017-09-25 NOTE — PROGRESS NOTES
SVE by Dr. Luke Lozano and in room to review monitor and to discuss plan of care.   Patient requesting epidural

## 2017-09-25 NOTE — PROGRESS NOTES
SBAR OUT Report: Mother    Verbal report given to Oriana(full name & credentials) on this patient, who is now being transferred to [facility] for routine progression of care. The patient is not wearing a green \"Anesthesia-Duramorph\" band. Report consisted of patient's Situation, Background, Assessment and Recommendations (SBAR). [place] ID bands were compared with the identification form, and verified with the patient and receiving nurse. Information from the SBAR, Procedure Summary, Intake/Output, MAR and Recent Results and the [place] Report was reviewed with the receiving nurse; opportunity for questions and clarification provided.

## 2017-09-25 NOTE — PROGRESS NOTES
Patient complaining of increased pain. Contractions every 3-4 minutes. SVE= 3/100/-2 [facility] Dr. Taylor Rea and updated on patient's status. Order received to give patient an epidural.   Hugo Maxwell . Doctor Mackenzie Awan to call Dr. Taylor Rea to discuss plan of care.

## 2017-09-25 NOTE — PROGRESS NOTES
Pt repositioned to the right side, sitting up and legs butterflied,   600 mls emptied from her crain  Pericare/pad change.    Sve: 8cms

## 2017-09-25 NOTE — L&D DELIVERY NOTE
Delivery Summary    Patient: Wendy Alonzo MRN: [id number]  SSN: [id number]    YOB: 1995  Age: 23 y.o. Sex: female       Information for the patient's :  Felicitas Phalen [[id number]]       Labor Events:    Labor: No   Rupture Date: 2017   Rupture Time: 2:27 PM   Rupture Type AROM   Amniotic Fluid Volume: Scant    Amniotic Fluid Description: Clear None   Induction:         Augmentation: Oxytocin;AROM   Labor Events: None     Cervical Ripening:           Delivery Events:  Episiotomy:     Laceration(s): Second degree perineal     Repaired: Yes    Number of Repair Packets: 1   Suture Type and Size: Vicryl 3-0     Estimated Blood Loss (ml): 250ml       Delivery Date: 2017    Delivery Time: 4:41 PM  Delivery Type: Vaginal, Spontaneous Delivery  Sex:  Female     Gestational Age: 38w3d   Delivery Clinician:  Ralph Escobar  Living Status: Living   Delivery Location: L&D            APGARS  One minute Five minutes Ten minutes   Skin color: 0   1        Heart rate: 2   2        Grimace: 2   2        Muscle tone: 1   2        Breathin   2        Totals: 7   9            Presentation: Vertex    Position:   Occiput Anterior  Resuscitation Method:  Tactile Stimulation;Suctioning-bulb     Meconium Stained: None      Cord Vessels: 3 Vessels      Cord Events:    Cord Blood Sent?:  Yes    Blood Gases Sent?:  Yes    Placenta:  Date/Time:   4:53 PM  Removal: Spontaneous      Appearance: Normal     [place] Measurements:  Birth Weight: 2.795 kg      Birth Length: 50 cm      Head Circumference: 34 cm      Chest Circumference: 33 cm     Abdominal Girth: Other Providers:   RORY Evangelista;LARA LUX;RITESH FRANKS;Yasmani MAC, Obstetrician;Primary Nurse;Primary [place] Nurse; Anesthesiologist;Crna; Charge Nurse           Group B Strep: No results found for: Cynthia Salas  Information for the patient's :  Felicitas Phalen [[id number]] No results found for: ABORH, PCTABR, Gema Gold, ABORH    Lab Results   Component Value Date/Time    APH 7.244 2017 04:44 PM    APCO2 49 2017 04:44 PM    APO2 22 (H) 2017 04:44 PM    AHCO3 21 (L) 2017 04:44 PM    ABDC 6.8 (H) 2017 04:44 PM    EPHV 7.246 2017 04:44 PM    PCO2V 49 (H) 2017 04:44 PM    PO2V 22 (L) 2017 04:44 PM    HCO3V 21 2017 04:44 PM    EBDV 6.8 2017 04:44 PM    SITE CORD 2017 04:44 PM    SITE CORD 2017 04:44 PM    RSCOM na at 2017 4 56 46 PM. Not read back. 2017 04:44 PM    RSCOM na at 2017 5 00 00 PM. Not read back. 2017 04:44 PM      over 2nd degree midline laceration, viable female, terminal meconium. Epidural analgesia. Suctioned on perineum, cord clamped and infant placed on mother's abdomen. Infant wt and apgars as above. Laceration repaired with 3-0 vicryl. Placenta spontaneous, intact, 3 v cord. EBL 250cc.

## 2017-09-25 NOTE — PROGRESS NOTES
Pt nauseated, shaking, feeling rectal pressure. If pt can tolerate it, she will remain on the peanut until 1415 then we will recheck. zofran ordered for nausea. Lr bolus given as well.

## 2017-09-25 NOTE — PROGRESS NOTES
Upon nursing rounds, patient resting with light off. Wakes up and asks for more pain medicine. Educated that medicine is ordered every 3 hours PRN. Spoke with Dr. Chevy Huitron and Fior ordered for patient. Upon nursing rounds at 2300, patient was sleeping.

## 2017-09-25 NOTE — PROGRESS NOTES
NICHOLEAR received from [facility]. Patient assumed for care Dr. Erika Garcia at bedside to discuss plan of care. Patient comfortable with epidural crain catheter draining well to gravity.

## 2017-09-25 NOTE — PROGRESS NOTES
Chapman catheter inserted per MD order. Patient tolerated very well. Clear, pale yellow urine immediately draining.

## 2017-09-25 NOTE — PROGRESS NOTES
22 yo  at 41w2d with iugr desired primary elective  and was scheduled for 7:30 am.  During the night pt began having painful regular contractions not relieved with stadol. Pt had eaten at 22:00 so csection was not safe at that time. Epidural placed around 4:00am. Pt is currently comfortable. sve /bbshahriar would like to proceed with vaginal delivery. Will start pcn for gbs+. fht's in 150's with episodes of min variability. IM ephedrine given. Continue with plan for vaginal delivery. Have discussed plan with patient, her partner and her mother.

## 2017-09-26 LAB
HBV SURFACE AG SERPL QL IA: NEGATIVE
HCT VFR BLD AUTO: 26.3 % (ref 35.8–46.3)
HGB BLD-MCNC: 8.3 G/DL (ref 11.7–15.4)

## 2017-09-26 PROCEDURE — 36415 COLL VENOUS BLD VENIPUNCTURE: CPT | Performed by: OBSTETRICS & GYNECOLOGY

## 2017-09-26 PROCEDURE — 74011250637 HC RX REV CODE- 250/637: Performed by: OBSTETRICS & GYNECOLOGY

## 2017-09-26 PROCEDURE — 65270000029 HC RM PRIVATE

## 2017-09-26 PROCEDURE — 85018 HEMOGLOBIN: CPT | Performed by: OBSTETRICS & GYNECOLOGY

## 2017-09-26 PROCEDURE — 87205 SMEAR GRAM STAIN: CPT | Performed by: OBSTETRICS & GYNECOLOGY

## 2017-09-26 RX ADMIN — DOCUSATE SODIUM 100 MG: 100 CAPSULE, LIQUID FILLED ORAL at 00:31

## 2017-09-26 RX ADMIN — HYDROCODONE BITARTRATE AND ACETAMINOPHEN 1 TABLET: 5; 325 TABLET ORAL at 09:38

## 2017-09-26 RX ADMIN — IBUPROFEN 800 MG: 800 TABLET ORAL at 20:52

## 2017-09-26 RX ADMIN — DOCUSATE SODIUM 100 MG: 100 CAPSULE, LIQUID FILLED ORAL at 09:38

## 2017-09-26 RX ADMIN — IBUPROFEN 800 MG: 800 TABLET ORAL at 09:38

## 2017-09-26 RX ADMIN — PRENATAL VIT W/ FE FUMARATE-FA TAB 27-0.8 MG 1 TABLET: 27-0.8 TAB at 09:38

## 2017-09-26 RX ADMIN — HYDROCODONE BITARTRATE AND ACETAMINOPHEN 1 TABLET: 5; 325 TABLET ORAL at 20:52

## 2017-09-26 RX ADMIN — WITCH HAZEL 1 PAD: 500 SOLUTION RECTAL; TOPICAL at 00:30

## 2017-09-26 RX ADMIN — BENZOCAINE AND MENTHOL 1 SPRAY: 20; .5 SPRAY TOPICAL at 09:37

## 2017-09-26 RX ADMIN — HYDROCODONE BITARTRATE AND ACETAMINOPHEN 1 TABLET: 5; 325 TABLET ORAL at 00:31

## 2017-09-26 RX ADMIN — IBUPROFEN 800 MG: 800 TABLET ORAL at 02:19

## 2017-09-26 RX ADMIN — DOCUSATE SODIUM 100 MG: 100 CAPSULE, LIQUID FILLED ORAL at 20:52

## 2017-09-26 RX ADMIN — HYDROCODONE BITARTRATE AND ACETAMINOPHEN 1 TABLET: 5; 325 TABLET ORAL at 05:48

## 2017-09-26 NOTE — PROGRESS NOTES
Patient given Motrin and Norco PO for patient reported pain 4/10 in perineum. See MAR. Call light within reach, bed wheels locked, bed in low position, and side rails up x 3. Patient encouraged to call for assistance. Family at bedside.

## 2017-09-26 NOTE — ANESTHESIA POSTPROCEDURE EVALUATION
Post-Anesthesia Evaluation and Assessment    Patient: Kirsten Groves MRN: [id number]  SSN: [id number]    YOB: 1995  Age: 23 y.o. Sex: female       Cardiovascular Function/Vital Signs  Visit Vitals    /59 (BP 1 Location: Left arm, BP Patient Position: At rest)    Pulse 91    Temp 36.6 °C (97.8 °F)    Resp 18    Ht 5' 4\" (1.626 m)    Wt 54.9 kg (121 lb)    SpO2 99%    Breastfeeding Unknown    BMI 20.77 kg/m2       Patient is status post epidural anesthesia for * No procedures listed *. Nausea/Vomiting: None    Postoperative hydration reviewed and adequate. Pain:  Pain Scale 1: Numeric (0 - 10) (09/25/17 2021)  Pain Intensity 1: 4 (09/25/17 2021)   Managed    Neurological Status:   Neuro (WDL): Within Defined Limits (09/25/17 1707)   At baseline    Mental Status and Level of Consciousness: Alert and oriented     Pulmonary Status:   O2 Device: Room air (09/25/17 1915)   Adequate oxygenation and airway patent    Complications related to anesthesia: None    Post-anesthesia assessment completed.  No concerns    Signed By: Jeff Soliz MD     September 25, 2017

## 2017-09-26 NOTE — PROGRESS NOTES
Progress Note                               Patient: Rain Butler MRN: [id number]  SSN: [id number]    YOB: 1995  Age: 23 y.o. Sex: female      Postpartum Day Number 1    Subjective:     Patient doing well postpartum without significant complaints. Voiding without difficulty. Patient reports normal lochia. . Breastfeeding: Yes . Having some difficulties - especially on right- will have lactation to see. Objective:     Patient Vitals for the past 18 hrs:   Temp Pulse Resp BP SpO2   17 0720 97.4 °F (36.3 °C) 72 20 107/60 -   17 2021 97.8 °F (36.6 °C) 91 18 121/59 -   17 1915 98.1 °F (36.7 °C) 91 18 - 99 %   17 1809 98.3 °F (36.8 °C) - - - -   17 1805 - 81 - 120/68 -   17 1750 - 81 - 116/66 -   17 1719 - 99 - 112/64 -   17 1707 - (!) 101 - 106/71 -   17 1704 - (!) 110 - (!) 73/47 -   17 1651 - 85 - 123/52 -   17 1620 - 62 - 114/68 -   17 1606 - 73 - 135/58 -   17 1550 - 78 - 117/63 -   17 1536 - 79 - 129/68 -   17 1521 99.5 °F (37.5 °C) 71 18 139/59 -        Temp (24hrs), Av.2 °F (36.8 °C), Min:97.4 °F (36.3 °C), Max:99.5 °F (37.5 °C)      Physical Exam:    General:   Patient without distress. Abdomen: Soft, fundus firm at level of umbilicus, nontender   Lower Extremities: Negative for swelling, cords, or tenderness. Mood: cheerful , engaged      Lab/Data Review: All lab results for the last 24 hours reviewed. Assessment and Plan:     Patient appears to be having an uncomplicated postpartum course. Continue routine perineal care and maternal education. Discussed risks of postpartum depression. Pt currently not depressed. Happy with delivery events. Baby doing well.     Signed By: Elizabeth Salazar MD     2017

## 2017-09-26 NOTE — PROGRESS NOTES
Per [facility] \[facility]\""TL, patient needs a wound culture of abcess of right upper thigh due to wrong color swab sent yesterday. States Dr. Liss Mott ordered and wrong specimen swab sent to lab.

## 2017-09-26 NOTE — PROGRESS NOTES
Placed call to Dr. Orellana  per patient's request for stronger pain medication. Updated MD on patient. No new orders received.

## 2017-09-26 NOTE — PROGRESS NOTES
Chart reviewed - history of depression/anxiety.  met with family and provided education/pamphlet on [facility] Postpartum [place] [facility] Visit. Family would like to receive home visit. Referral will be made at discharge. Patient confirms history of depression/anxiety. Patient states that she \"depends on herself\" and has never taken medication or received counseling.  offered to arrange counseling appointment for patient; patient declined. Patient states that some depression has been related to the recent loss of a family member that has taken a significant toll on the entire family. Patient/boyfriend recently moved to [place] from [facility]. They report that they have a significant support system of family in the area.  provided education and literature on support available thru [employer] ([employer]). [employer] Warmline:  3-196-466-4PPD (6439). WWW. POSTPARTUM. NET    Family was informed of signs/symptoms, forms of intervention (medication, counseling, education), and resources (local coordinators available telephonically, monthly support group in [place], weekly \"chat with expert\" phone sessions). Additionally, patient was provided with [facility] Fer Checklist.\"      Discussed importance of self-care and accepting help when offered. Family was encouraged to contact me with any questions/needs -  contact information provided.       Pepe Francois, [address] [facility] [address]

## 2017-09-26 NOTE — PROGRESS NOTES
Bedside report completed with Any PATRICK RN. Plan of care reviewed with patient, verbalized understanding. Care assumed.

## 2017-09-26 NOTE — PROGRESS NOTES
Patient with wound on  lateral right thigh area, removed Tegaderm and 2x2, pin hole open area in scar tissue, patient states has had multiple surgeries and has chronic infection, inquired who treats her, patient states she self treats, states anitibotics did nothing, now treats holisticly, pin hole swab, minimum, would say scant, off white in color, no odor but when closer to wound, very faint odor, covered 2x2 and tegaderm, educated and encouraged both Mother and Father on good handwashing especially with infant, both verbalizes understanding

## 2017-09-26 NOTE — LACTATION NOTE
This note was copied from a baby's chart. In to check on feedings. Baby just finished feed on left breast per mom, baby sleeping now skin to skin with mom. Mom reports baby \"feeding all the time\". Reviewed normalcy of feeds, cluster feeding normalcy and expectations. Allow baby to feed on demand. Reviewed  stomach size and colostrum volume/production. Void but no stool yet. Mom describes a good latch. Doing slightly better on left breast than right per mom. Latch not observed at this time. Mom encouraged to call out at next feeding for lactation to observed latch. Discussed feeding expectations. Watch for feeding cues, feed on demand. Questions answered.

## 2017-09-26 NOTE — PROGRESS NOTES
Patient given [place] PO for patient reported pain 5/10 in perineum. See MAR. Call light within reach, bed wheels locked, bed in low position, and side rails up x 3. Patient encouraged to call for assistance. Family at bedside.

## 2017-09-26 NOTE — PROGRESS NOTES
Patient given [place] PO for patient reported pain 5/10 in perineum. See MAR. Call light within reach, bed wheels locked, bed in low position, and side rails up x 3. Patient encouraged to call for assistance. Family at bedside.

## 2017-09-26 NOTE — PROGRESS NOTES
SBAR IN Report: Mother    Verbal report received from Davis Meckel, RN  on this patient, who is now being transferred from L&D for routine progression of care. The patient is not wearing a green \"Anesthesia-Duramorph\" band. Report consisted of patient's Situation, Background, Assessment and Recommendations (SBAR).  ID bands were compared with the identification form, and verified with the patient and transferring nurse. Information from the SBAR and the [place] Report was reviewed with the transferring nurse; opportunity for questions and clarification provided.

## 2017-09-26 NOTE — LACTATION NOTE

## 2017-09-27 VITALS
SYSTOLIC BLOOD PRESSURE: 91 MMHG | WEIGHT: 121 LBS | DIASTOLIC BLOOD PRESSURE: 51 MMHG | BODY MASS INDEX: 20.66 KG/M2 | TEMPERATURE: 98.3 F | OXYGEN SATURATION: 99 % | HEIGHT: 64 IN | HEART RATE: 74 BPM | RESPIRATION RATE: 18 BRPM

## 2017-09-27 LAB
BACTERIA SPEC CULT: NORMAL
HCV RNA SERPL QL NAA+PROBE: NEGATIVE
SERVICE CMNT-IMP: NORMAL

## 2017-09-27 PROCEDURE — 74011250637 HC RX REV CODE- 250/637: Performed by: OBSTETRICS & GYNECOLOGY

## 2017-09-27 RX ADMIN — PRENATAL VIT W/ FE FUMARATE-FA TAB 27-0.8 MG 1 TABLET: 27-0.8 TAB at 09:13

## 2017-09-27 RX ADMIN — HYDROCODONE BITARTRATE AND ACETAMINOPHEN 1 TABLET: 5; 325 TABLET ORAL at 09:13

## 2017-09-27 RX ADMIN — DOCUSATE SODIUM 100 MG: 100 CAPSULE, LIQUID FILLED ORAL at 09:13

## 2017-09-27 RX ADMIN — IBUPROFEN 800 MG: 800 TABLET ORAL at 04:21

## 2017-09-27 RX ADMIN — HYDROCODONE BITARTRATE AND ACETAMINOPHEN 1 TABLET: 5; 325 TABLET ORAL at 04:21
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
## 2017-09-27 NOTE — LACTATION NOTE
In to see mom and infant prior to discharge to home. Mom stated that infant continues to latch and nurse well. Mom did pump once during the night and dot several large drops. Mom had questions in regards as to when to introduce formula and when to stop offering the breast. Informed her that was a personal decision and she would need to do what worked best for her and her infant. Also reviewed discharge information. Informed her that our outpatient lactation consultant is available and for her to call her as needed while providing her breastmilk.

## 2017-09-27 NOTE — PROGRESS NOTES
Discharge instructions completed. Patient voiced understanding. No prescriptions given. Patient is aware to make a follow up appointment with Dr. Bonifacio Machado to be seen in 2 weeks and telephone number given. Patient discharged home. Ambulating. Wheelchair refused.

## 2017-09-27 NOTE — DISCHARGE INSTRUCTIONS
After Your Delivery (the Postpartum Period): Care Instructions  Your Care Instructions  Congratulations on the birth of your baby. Like pregnancy, the  period can be a time of excitement, chichi, and exhaustion. You may look at your wondrous little baby and feel happy. You may also be overwhelmed by your new sleep hours and new responsibilities. At first, babies often sleep during the days and are awake at night. They do not have a pattern or routine. They may make sudden gasps, jerk themselves awake, or look like they have crossed eyes. These are all normal, and they may even make you smile. In these first weeks after delivery, try to take good care of yourself. It may take 4 to 6 weeks to feel like yourself again, and possibly longer if you had a  birth. You will likely feel very tired for several weeks. Your days will be full of ups and downs, but lots of chichi as well. Follow-up care is a key part of your treatment and safety. Be sure to make and go to all appointments, and call your doctor if you are having problems. It's also a good idea to know your test results and keep a list of the medicines you take. How can you care for yourself at home? Take care of your body after delivery  · Use pads instead of tampons for the bloody flow that may last as long as 2 weeks. · Ease cramps with ibuprofen (Advil, Motrin). · Ease soreness of hemorrhoids and the area between your vagina and rectum with ice compresses or witch hazel pads. · Ease constipation by drinking lots of fluid and eating high-fiber foods. Ask your doctor about over-the-counter stool softeners. · Cleanse yourself with a gentle squeeze of warm water from a bottle instead of wiping with toilet paper. · Take a sitz bath in warm water several times a day. · Wear a good nursing bra. Ease sore and swollen breasts with warm, wet washcloths. · If you are not breastfeeding, use ice rather than heat for breast soreness.   · Your period may not start for several months if you are breastfeeding. You may bleed more, and longer at first, than you did before you got pregnant. · Wait until you are healed (about 4 to 6 weeks) before you have sexual intercourse. Your doctor will tell you when it is okay to have sex. · Try not to travel with your baby for 5 or 6 weeks. If you take a long car trip, make frequent stops to walk around and stretch. Avoid exhaustion  · Rest every day. Try to nap when your baby naps. · Ask another adult to be with you for a few days after delivery. · Plan for  if you have other children. · Stay flexible so you can eat at odd hours and sleep when you need to. Both you and your baby are making new schedules. · Plan small trips to get out of the house. Change can make you feel less tired. · Ask for help with housework, cooking, and shopping. Remind yourself that your job is to care for your baby. Know about help for postpartum depression  · \"Baby blues\" are common for the first 1 to 2 weeks after birth. You may cry or feel sad or irritable for no reason. · Rest whenever you can. Being tired makes it harder to handle your emotions. · Go for walks with your baby. · Talk to your partner, friends, and family about your feelings. · If your symptoms last for more than a few weeks, or if you feel very depressed, ask your doctor for help. · Postpartum depression can be treated. Support groups and counseling can help. Sometimes medicine can also help. Stay healthy  · Eat healthy foods so you have more energy, make good breast milk, and lose extra baby pounds. · If you breastfeed, avoid alcohol and drugs. Stay smoke-free. If you quit during pregnancy, congratulations. · Start daily exercise after 4 to 6 weeks, but rest when you feel tired. · Learn exercises to tone your belly. Do Kegel exercises to regain strength in your pelvic muscles. You can do these exercises while you stand or sit.   ¨ Squeeze the same muscles you would use to stop your urine. Your belly and thighs should not move. ¨ Hold the squeeze for 3 seconds, and then relax for 3 seconds. ¨ Start with 3 seconds. Then add 1 second each week until you are able to squeeze for 10 seconds. ¨ Repeat the exercise 10 to 15 times for each session. Do three or more sessions each day. · Find a class for new mothers and new babies that has an exercise time. · If you had a  birth, give yourself a bit more time before you exercise, and be careful. When should you call for help? Call 911 anytime you think you may need emergency care. For example, call if:  · You passed out (lost consciousness). Call your doctor now or seek immediate medical care if:  · You have severe vaginal bleeding. This means you are passing blood clots and soaking through a pad each hour for 2 or more hours. · You are dizzy or lightheaded, or you feel like you may faint. · You have a fever. · You have new belly pain, or your pain gets worse. Watch closely for changes in your health, and be sure to contact your doctor if:  · Your vaginal bleeding seems to be getting heavier. · You have new or worse vaginal discharge. · You feel sad, anxious, or hopeless for more than a few days. · You do not get better as expected. Where can you learn more? Go to http://carlene-carol ann.info/. Enter A461 in the search box to learn more about \"After Your Delivery (the Postpartum Period): Care Instructions. \"  Current as of: 2017  Content Version: 11.3  © 9198-7776 [employer]. Care instructions adapted under license by [employer] (which disclaims liability or warranty for this information). If you have questions about a medical condition or this instruction, always ask your healthcare professional. [address] any warranty or liability for your use of this information.

## 2017-09-28 LAB
BACTERIA SPEC CULT: NORMAL
GRAM STN SPEC: NORMAL
GRAM STN SPEC: NORMAL
SERVICE CMNT-IMP: NORMAL

## 2020-06-17 NOTE — PROGRESS NOTES
I believe this is to be done through the surgery center that she's having her procedure done at    Pitocin augmentation started as ordered. Medication explained and reviewed. Pt agreeable to plan. pcn infusing, dose #2. Pt resting on her left side. Offered to readjust position, but pt states she is comfortable. Legs butterflied but propped up with pillows underneath thighs and knees. Laboring down explained and pt agreeable as well.